# Patient Record
Sex: FEMALE | Race: WHITE | Employment: FULL TIME | URBAN - METROPOLITAN AREA
[De-identification: names, ages, dates, MRNs, and addresses within clinical notes are randomized per-mention and may not be internally consistent; named-entity substitution may affect disease eponyms.]

---

## 2019-06-27 ENCOUNTER — APPOINTMENT (OUTPATIENT)
Dept: GENERAL RADIOLOGY | Age: 59
DRG: 872 | End: 2019-06-27
Payer: COMMERCIAL

## 2019-06-27 ENCOUNTER — HOSPITAL ENCOUNTER (INPATIENT)
Age: 59
LOS: 2 days | Discharge: HOME | DRG: 872 | End: 2019-06-30
Attending: EMERGENCY MEDICINE | Admitting: INTERNAL MEDICINE
Payer: COMMERCIAL

## 2019-06-27 DIAGNOSIS — R78.81 BACTEREMIA: ICD-10-CM

## 2019-06-27 DIAGNOSIS — R42 LIGHTHEADEDNESS: ICD-10-CM

## 2019-06-27 DIAGNOSIS — R51.9 NONINTRACTABLE HEADACHE, UNSPECIFIED CHRONICITY PATTERN, UNSPECIFIED HEADACHE TYPE: ICD-10-CM

## 2019-06-27 DIAGNOSIS — M79.10 MYALGIA: ICD-10-CM

## 2019-06-27 DIAGNOSIS — R65.10 SIRS (SYSTEMIC INFLAMMATORY RESPONSE SYNDROME) (HCC): Primary | ICD-10-CM

## 2019-06-27 LAB
ALBUMIN SERPL-MCNC: 4.2 G/DL (ref 3.5–5.2)
ALP BLD-CCNC: 111 U/L (ref 35–104)
ALT SERPL-CCNC: 17 U/L (ref 0–32)
ANION GAP SERPL CALCULATED.3IONS-SCNC: 12 MMOL/L (ref 7–16)
AST SERPL-CCNC: 20 U/L (ref 0–31)
B.E.: 1 MMOL/L (ref -3–3)
BILIRUB SERPL-MCNC: 0.7 MG/DL (ref 0–1.2)
BILIRUBIN URINE: NEGATIVE
BLOOD, URINE: NEGATIVE
BUN BLDV-MCNC: 26 MG/DL (ref 8–23)
CALCIUM SERPL-MCNC: 9.6 MG/DL (ref 8.6–10.2)
CHLORIDE BLD-SCNC: 100 MMOL/L (ref 98–107)
CHP ED QC CHECK: YES
CLARITY: CLEAR
CO2: 23 MMOL/L (ref 22–29)
COHB: 3.5 % (ref 0–1.5)
COLOR: YELLOW
COMMENT: ABNORMAL
CREAT SERPL-MCNC: 1.1 MG/DL (ref 0.5–1)
CRITICAL: ABNORMAL
DATE ANALYZED: ABNORMAL
DATE OF COLLECTION: ABNORMAL
FIO2: 35 %
GFR AFRICAN AMERICAN: >60
GFR NON-AFRICAN AMERICAN: 51 ML/MIN/1.73
GLUCOSE BLD-MCNC: 137 MG/DL
GLUCOSE BLD-MCNC: 143 MG/DL (ref 74–99)
GLUCOSE URINE: NEGATIVE MG/DL
HCO3: 29.7 MMOL/L (ref 22–26)
HCT VFR BLD CALC: 39.1 % (ref 34–48)
HEMOGLOBIN: 12.7 G/DL (ref 11.5–15.5)
HHB: 7.7 % (ref 0–5)
INFLUENZA A BY PCR: NOT DETECTED
INFLUENZA B BY PCR: NOT DETECTED
KETONES, URINE: NEGATIVE MG/DL
LAB: ABNORMAL
LACTIC ACID: 1.4 MMOL/L (ref 0.5–2.2)
LEUKOCYTE ESTERASE, URINE: NEGATIVE
Lab: ABNORMAL
MCH RBC QN AUTO: 29.3 PG (ref 26–35)
MCHC RBC AUTO-ENTMCNC: 32.5 % (ref 32–34.5)
MCV RBC AUTO: 90.1 FL (ref 80–99.9)
METER GLUCOSE: 137 MG/DL (ref 74–99)
METHB: 0.4 % (ref 0–1.5)
MODE: ABNORMAL
NITRITE, URINE: NEGATIVE
O2 CONTENT: 17.7 ML/DL
O2 SATURATION: 92 % (ref 92–98.5)
O2HB: 88.4 % (ref 94–97)
OPERATOR ID: ABNORMAL
PATIENT TEMP: 37 C
PCO2: 65.8 MMHG (ref 35–45)
PDW BLD-RTO: 14.2 FL (ref 11.5–15)
PEEP/CPAP: 6 CMH2O
PFO2: 1.95 MMHG/%
PH BLOOD GAS: 7.27 (ref 7.35–7.45)
PH UA: 6 (ref 5–9)
PLATELET # BLD: 205 E9/L (ref 130–450)
PMV BLD AUTO: 10.2 FL (ref 7–12)
PO2: 68.2 MMHG (ref 60–100)
POTASSIUM REFLEX MAGNESIUM: 4.4 MMOL/L (ref 3.5–5)
PROTEIN UA: NEGATIVE MG/DL
PS: 14 CMH20
RBC # BLD: 4.34 E12/L (ref 3.5–5.5)
RI(T): 1.41
RR MECHANICAL: 16 B/MIN
SODIUM BLD-SCNC: 135 MMOL/L (ref 132–146)
SOURCE, BLOOD GAS: ABNORMAL
SPECIFIC GRAVITY UA: 1.01 (ref 1–1.03)
T4 TOTAL: 7.3 MCG/DL (ref 4.5–11.7)
THB: 14.2 G/DL (ref 11.5–16.5)
TIME ANALYZED: 1921
TOTAL PROTEIN: 7.5 G/DL (ref 6.4–8.3)
TROPONIN: <0.01 NG/ML (ref 0–0.03)
TSH SERPL DL<=0.05 MIU/L-ACNC: 1.71 UIU/ML (ref 0.27–4.2)
UROBILINOGEN, URINE: 0.2 E.U./DL
WBC # BLD: 20.6 E9/L (ref 4.5–11.5)

## 2019-06-27 PROCEDURE — 84443 ASSAY THYROID STIM HORMONE: CPT

## 2019-06-27 PROCEDURE — 2580000003 HC RX 258: Performed by: EMERGENCY MEDICINE

## 2019-06-27 PROCEDURE — 96374 THER/PROPH/DIAG INJ IV PUSH: CPT

## 2019-06-27 PROCEDURE — 96367 TX/PROPH/DG ADDL SEQ IV INF: CPT

## 2019-06-27 PROCEDURE — 71045 X-RAY EXAM CHEST 1 VIEW: CPT

## 2019-06-27 PROCEDURE — 6360000002 HC RX W HCPCS: Performed by: EMERGENCY MEDICINE

## 2019-06-27 PROCEDURE — 84145 PROCALCITONIN (PCT): CPT

## 2019-06-27 PROCEDURE — 87502 INFLUENZA DNA AMP PROBE: CPT

## 2019-06-27 PROCEDURE — 96366 THER/PROPH/DIAG IV INF ADDON: CPT

## 2019-06-27 PROCEDURE — 83605 ASSAY OF LACTIC ACID: CPT

## 2019-06-27 PROCEDURE — 84484 ASSAY OF TROPONIN QUANT: CPT

## 2019-06-27 PROCEDURE — 82803 BLOOD GASES ANY COMBINATION: CPT

## 2019-06-27 PROCEDURE — 87186 SC STD MICRODIL/AGAR DIL: CPT

## 2019-06-27 PROCEDURE — 80053 COMPREHEN METABOLIC PANEL: CPT

## 2019-06-27 PROCEDURE — 82962 GLUCOSE BLOOD TEST: CPT

## 2019-06-27 PROCEDURE — 96365 THER/PROPH/DIAG IV INF INIT: CPT

## 2019-06-27 PROCEDURE — G0378 HOSPITAL OBSERVATION PER HR: HCPCS

## 2019-06-27 PROCEDURE — 2580000003 HC RX 258: Performed by: INTERNAL MEDICINE

## 2019-06-27 PROCEDURE — 85027 COMPLETE CBC AUTOMATED: CPT

## 2019-06-27 PROCEDURE — 6370000000 HC RX 637 (ALT 250 FOR IP): Performed by: EMERGENCY MEDICINE

## 2019-06-27 PROCEDURE — 87147 CULTURE TYPE IMMUNOLOGIC: CPT

## 2019-06-27 PROCEDURE — 87040 BLOOD CULTURE FOR BACTERIA: CPT

## 2019-06-27 PROCEDURE — 96375 TX/PRO/DX INJ NEW DRUG ADDON: CPT

## 2019-06-27 PROCEDURE — 84436 ASSAY OF TOTAL THYROXINE: CPT

## 2019-06-27 PROCEDURE — 99285 EMERGENCY DEPT VISIT HI MDM: CPT

## 2019-06-27 PROCEDURE — 81003 URINALYSIS AUTO W/O SCOPE: CPT

## 2019-06-27 PROCEDURE — 93005 ELECTROCARDIOGRAM TRACING: CPT

## 2019-06-27 PROCEDURE — 36415 COLL VENOUS BLD VENIPUNCTURE: CPT

## 2019-06-27 RX ORDER — SODIUM CHLORIDE 0.9 % (FLUSH) 0.9 %
10 SYRINGE (ML) INJECTION EVERY 12 HOURS SCHEDULED
Status: DISCONTINUED | OUTPATIENT
Start: 2019-06-27 | End: 2019-06-30 | Stop reason: HOSPADM

## 2019-06-27 RX ORDER — LISINOPRIL 5 MG/1
5 TABLET ORAL DAILY
COMMUNITY

## 2019-06-27 RX ORDER — ONDANSETRON 2 MG/ML
4 INJECTION INTRAMUSCULAR; INTRAVENOUS ONCE
Status: DISCONTINUED | OUTPATIENT
Start: 2019-06-27 | End: 2019-06-30 | Stop reason: HOSPADM

## 2019-06-27 RX ORDER — TRIAMTERENE AND HYDROCHLOROTHIAZIDE 37.5; 25 MG/1; MG/1
1 TABLET ORAL DAILY
COMMUNITY

## 2019-06-27 RX ORDER — MELOXICAM 15 MG/1
15 TABLET ORAL DAILY
COMMUNITY

## 2019-06-27 RX ORDER — 0.9 % SODIUM CHLORIDE 0.9 %
1000 INTRAVENOUS SOLUTION INTRAVENOUS ONCE
Status: COMPLETED | OUTPATIENT
Start: 2019-06-27 | End: 2019-06-27

## 2019-06-27 RX ORDER — FLUOXETINE HYDROCHLORIDE 20 MG/1
20 CAPSULE ORAL DAILY
Status: DISCONTINUED | OUTPATIENT
Start: 2019-06-28 | End: 2019-06-30 | Stop reason: HOSPADM

## 2019-06-27 RX ORDER — ACETAMINOPHEN 500 MG
500 TABLET ORAL ONCE
Status: COMPLETED | OUTPATIENT
Start: 2019-06-27 | End: 2019-06-27

## 2019-06-27 RX ORDER — ASPIRIN 81 MG/1
81 TABLET, CHEWABLE ORAL DAILY
Status: DISCONTINUED | OUTPATIENT
Start: 2019-06-28 | End: 2019-06-30 | Stop reason: HOSPADM

## 2019-06-27 RX ORDER — ONDANSETRON 2 MG/ML
4 INJECTION INTRAMUSCULAR; INTRAVENOUS EVERY 6 HOURS PRN
Status: DISCONTINUED | OUTPATIENT
Start: 2019-06-27 | End: 2019-06-30 | Stop reason: HOSPADM

## 2019-06-27 RX ORDER — KETOROLAC TROMETHAMINE 30 MG/ML
30 INJECTION, SOLUTION INTRAMUSCULAR; INTRAVENOUS ONCE
Status: COMPLETED | OUTPATIENT
Start: 2019-06-27 | End: 2019-06-27

## 2019-06-27 RX ORDER — ACETAMINOPHEN 325 MG/1
650 TABLET ORAL EVERY 4 HOURS PRN
Status: DISCONTINUED | OUTPATIENT
Start: 2019-06-27 | End: 2019-06-30 | Stop reason: HOSPADM

## 2019-06-27 RX ORDER — SODIUM CHLORIDE 0.9 % (FLUSH) 0.9 %
10 SYRINGE (ML) INJECTION PRN
Status: DISCONTINUED | OUTPATIENT
Start: 2019-06-27 | End: 2019-06-30 | Stop reason: HOSPADM

## 2019-06-27 RX ADMIN — PIPERACILLIN, TAZOBACTAM 4.5 G: 4; .5 INJECTION, POWDER, LYOPHILIZED, FOR SOLUTION INTRAVENOUS at 20:05

## 2019-06-27 RX ADMIN — VANCOMYCIN HYDROCHLORIDE 2000 MG: 10 INJECTION, POWDER, LYOPHILIZED, FOR SOLUTION INTRAVENOUS at 21:42

## 2019-06-27 RX ADMIN — ACETAMINOPHEN 500 MG: 500 TABLET, FILM COATED ORAL at 17:51

## 2019-06-27 RX ADMIN — Medication 10 ML: at 21:44

## 2019-06-27 RX ADMIN — KETOROLAC TROMETHAMINE 30 MG: 30 INJECTION, SOLUTION INTRAMUSCULAR at 19:56

## 2019-06-27 RX ADMIN — SODIUM CHLORIDE 1000 ML: 9 INJECTION, SOLUTION INTRAVENOUS at 17:51

## 2019-06-27 ASSESSMENT — PAIN SCALES - GENERAL
PAINLEVEL_OUTOF10: 5
PAINLEVEL_OUTOF10: 5
PAINLEVEL_OUTOF10: 0
PAINLEVEL_OUTOF10: 5
PAINLEVEL_OUTOF10: 5

## 2019-06-27 ASSESSMENT — PAIN DESCRIPTION - DESCRIPTORS
DESCRIPTORS: ACHING;SORE;DULL
DESCRIPTORS: ACHING;SORE;DULL

## 2019-06-27 ASSESSMENT — PAIN DESCRIPTION - FREQUENCY
FREQUENCY: CONTINUOUS
FREQUENCY: CONTINUOUS

## 2019-06-27 ASSESSMENT — PAIN DESCRIPTION - PROGRESSION
CLINICAL_PROGRESSION: GRADUALLY WORSENING
CLINICAL_PROGRESSION: NOT CHANGED

## 2019-06-27 ASSESSMENT — PAIN - FUNCTIONAL ASSESSMENT: PAIN_FUNCTIONAL_ASSESSMENT: PREVENTS OR INTERFERES SOME ACTIVE ACTIVITIES AND ADLS

## 2019-06-27 ASSESSMENT — PAIN DESCRIPTION - PAIN TYPE
TYPE: ACUTE PAIN
TYPE: ACUTE PAIN

## 2019-06-27 ASSESSMENT — PAIN DESCRIPTION - ONSET: ONSET: SUDDEN

## 2019-06-28 ENCOUNTER — APPOINTMENT (OUTPATIENT)
Dept: ULTRASOUND IMAGING | Age: 59
DRG: 872 | End: 2019-06-28
Payer: COMMERCIAL

## 2019-06-28 PROBLEM — R78.81 BACTEREMIA: Status: ACTIVE | Noted: 2019-06-28

## 2019-06-28 LAB
ALBUMIN SERPL-MCNC: 4 G/DL (ref 3.5–5.2)
ALP BLD-CCNC: 108 U/L (ref 35–104)
ALT SERPL-CCNC: 28 U/L (ref 0–32)
ANION GAP SERPL CALCULATED.3IONS-SCNC: 12 MMOL/L (ref 7–16)
ANTISTREPTOLYSIN-O: 39 IU/ML (ref 0–200)
AST SERPL-CCNC: 30 U/L (ref 0–31)
BILIRUB SERPL-MCNC: 1 MG/DL (ref 0–1.2)
BUN BLDV-MCNC: 23 MG/DL (ref 8–23)
C-REACTIVE PROTEIN: 15.4 MG/DL (ref 0–0.4)
CALCIUM SERPL-MCNC: 8.7 MG/DL (ref 8.6–10.2)
CHLORIDE BLD-SCNC: 100 MMOL/L (ref 98–107)
CHOLESTEROL, TOTAL: 163 MG/DL (ref 0–199)
CO2: 22 MMOL/L (ref 22–29)
CREAT SERPL-MCNC: 1.2 MG/DL (ref 0.5–1)
FILM ARRAY ADENOVIRUS: NORMAL
FILM ARRAY ADENOVIRUS: NORMAL
FILM ARRAY BORDETELLA PERTUSSIS: NORMAL
FILM ARRAY BORDETELLA PERTUSSIS: NORMAL
FILM ARRAY CHLAMYDOPHILIA PNEUMONIAE: NORMAL
FILM ARRAY CHLAMYDOPHILIA PNEUMONIAE: NORMAL
FILM ARRAY CORONAVIRUS 229E: NORMAL
FILM ARRAY CORONAVIRUS 229E: NORMAL
FILM ARRAY CORONAVIRUS HKU1: NORMAL
FILM ARRAY CORONAVIRUS HKU1: NORMAL
FILM ARRAY CORONAVIRUS NL63: NORMAL
FILM ARRAY CORONAVIRUS NL63: NORMAL
FILM ARRAY CORONAVIRUS OC43: NORMAL
FILM ARRAY CORONAVIRUS OC43: NORMAL
FILM ARRAY INFLUENZA A VIRUS 09H1: NORMAL
FILM ARRAY INFLUENZA A VIRUS 09H1: NORMAL
FILM ARRAY INFLUENZA A VIRUS H1: NORMAL
FILM ARRAY INFLUENZA A VIRUS H1: NORMAL
FILM ARRAY INFLUENZA A VIRUS H3: NORMAL
FILM ARRAY INFLUENZA A VIRUS H3: NORMAL
FILM ARRAY INFLUENZA A VIRUS: NORMAL
FILM ARRAY INFLUENZA A VIRUS: NORMAL
FILM ARRAY INFLUENZA B: NORMAL
FILM ARRAY INFLUENZA B: NORMAL
FILM ARRAY METAPNEUMOVIRUS: NORMAL
FILM ARRAY METAPNEUMOVIRUS: NORMAL
FILM ARRAY MYCOPLASMA PNEUMONIAE: NORMAL
FILM ARRAY MYCOPLASMA PNEUMONIAE: NORMAL
FILM ARRAY PARAINFLUENZA VIRUS 1: NORMAL
FILM ARRAY PARAINFLUENZA VIRUS 1: NORMAL
FILM ARRAY PARAINFLUENZA VIRUS 2: NORMAL
FILM ARRAY PARAINFLUENZA VIRUS 2: NORMAL
FILM ARRAY PARAINFLUENZA VIRUS 3: NORMAL
FILM ARRAY PARAINFLUENZA VIRUS 3: NORMAL
FILM ARRAY PARAINFLUENZA VIRUS 4: NORMAL
FILM ARRAY PARAINFLUENZA VIRUS 4: NORMAL
FILM ARRAY RESPIRATORY SYNCITIAL VIRUS: NORMAL
FILM ARRAY RESPIRATORY SYNCITIAL VIRUS: NORMAL
FILM ARRAY RHINOVIRUS/ENTEROVIRUS: NORMAL
FILM ARRAY RHINOVIRUS/ENTEROVIRUS: NORMAL
GFR AFRICAN AMERICAN: 55
GFR NON-AFRICAN AMERICAN: 46 ML/MIN/1.73
GLUCOSE BLD-MCNC: 159 MG/DL (ref 74–99)
HBA1C MFR BLD: 6.2 % (ref 4–5.6)
HCT VFR BLD CALC: 37.5 % (ref 34–48)
HDLC SERPL-MCNC: 78 MG/DL
HEMOGLOBIN: 12.3 G/DL (ref 11.5–15.5)
LDL CHOLESTEROL CALCULATED: 73 MG/DL (ref 0–99)
MAGNESIUM: 2 MG/DL (ref 1.6–2.6)
MCH RBC QN AUTO: 29.1 PG (ref 26–35)
MCHC RBC AUTO-ENTMCNC: 32.8 % (ref 32–34.5)
MCV RBC AUTO: 88.9 FL (ref 80–99.9)
METER GLUCOSE: 140 MG/DL (ref 74–99)
PDW BLD-RTO: 14.3 FL (ref 11.5–15)
PHOSPHORUS: 3 MG/DL (ref 2.5–4.5)
PLATELET # BLD: 211 E9/L (ref 130–450)
PMV BLD AUTO: 9.7 FL (ref 7–12)
POTASSIUM SERPL-SCNC: 4.4 MMOL/L (ref 3.5–5)
PROCALCITONIN: 0.14 NG/ML (ref 0–0.08)
RBC # BLD: 4.22 E12/L (ref 3.5–5.5)
SEDIMENTATION RATE, ERYTHROCYTE: 38 MM/HR (ref 0–20)
SODIUM BLD-SCNC: 134 MMOL/L (ref 132–146)
TOTAL PROTEIN: 7 G/DL (ref 6.4–8.3)
TRIGL SERPL-MCNC: 61 MG/DL (ref 0–149)
VLDLC SERPL CALC-MCNC: 12 MG/DL
WBC # BLD: 18.5 E9/L (ref 4.5–11.5)

## 2019-06-28 PROCEDURE — 1200000000 HC SEMI PRIVATE

## 2019-06-28 PROCEDURE — 82962 GLUCOSE BLOOD TEST: CPT

## 2019-06-28 PROCEDURE — 87486 CHLMYD PNEUM DNA AMP PROBE: CPT

## 2019-06-28 PROCEDURE — 87798 DETECT AGENT NOS DNA AMP: CPT

## 2019-06-28 PROCEDURE — 6360000002 HC RX W HCPCS: Performed by: INTERNAL MEDICINE

## 2019-06-28 PROCEDURE — 6360000002 HC RX W HCPCS: Performed by: SPECIALIST

## 2019-06-28 PROCEDURE — 87088 URINE BACTERIA CULTURE: CPT

## 2019-06-28 PROCEDURE — 84100 ASSAY OF PHOSPHORUS: CPT

## 2019-06-28 PROCEDURE — 87581 M.PNEUMON DNA AMP PROBE: CPT

## 2019-06-28 PROCEDURE — 83036 HEMOGLOBIN GLYCOSYLATED A1C: CPT

## 2019-06-28 PROCEDURE — 6370000000 HC RX 637 (ALT 250 FOR IP): Performed by: SPECIALIST

## 2019-06-28 PROCEDURE — 85027 COMPLETE CBC AUTOMATED: CPT

## 2019-06-28 PROCEDURE — 87633 RESP VIRUS 12-25 TARGETS: CPT

## 2019-06-28 PROCEDURE — 80053 COMPREHEN METABOLIC PANEL: CPT

## 2019-06-28 PROCEDURE — 87040 BLOOD CULTURE FOR BACTERIA: CPT

## 2019-06-28 PROCEDURE — 86060 ANTISTREPTOLYSIN O TITER: CPT

## 2019-06-28 PROCEDURE — 2580000003 HC RX 258: Performed by: INTERNAL MEDICINE

## 2019-06-28 PROCEDURE — 86140 C-REACTIVE PROTEIN: CPT

## 2019-06-28 PROCEDURE — 85651 RBC SED RATE NONAUTOMATED: CPT

## 2019-06-28 PROCEDURE — 87450 HC DIRECT STREP B ANTIGEN: CPT

## 2019-06-28 PROCEDURE — 36415 COLL VENOUS BLD VENIPUNCTURE: CPT

## 2019-06-28 PROCEDURE — 83735 ASSAY OF MAGNESIUM: CPT

## 2019-06-28 PROCEDURE — 6370000000 HC RX 637 (ALT 250 FOR IP): Performed by: INTERNAL MEDICINE

## 2019-06-28 PROCEDURE — 93971 EXTREMITY STUDY: CPT

## 2019-06-28 PROCEDURE — 2580000003 HC RX 258: Performed by: SPECIALIST

## 2019-06-28 PROCEDURE — 80061 LIPID PANEL: CPT

## 2019-06-28 RX ORDER — LINEZOLID 600 MG/1
600 TABLET, FILM COATED ORAL EVERY 12 HOURS SCHEDULED
Qty: 28 TABLET | Refills: 0 | Status: SHIPPED | OUTPATIENT
Start: 2019-06-28 | End: 2019-07-12

## 2019-06-28 RX ORDER — LINEZOLID 600 MG/1
600 TABLET, FILM COATED ORAL EVERY 12 HOURS SCHEDULED
Status: DISCONTINUED | OUTPATIENT
Start: 2019-06-28 | End: 2019-06-29

## 2019-06-28 RX ORDER — SODIUM CHLORIDE, SODIUM LACTATE, POTASSIUM CHLORIDE, CALCIUM CHLORIDE 600; 310; 30; 20 MG/100ML; MG/100ML; MG/100ML; MG/100ML
INJECTION, SOLUTION INTRAVENOUS CONTINUOUS
Status: DISCONTINUED | OUTPATIENT
Start: 2019-06-28 | End: 2019-06-30 | Stop reason: HOSPADM

## 2019-06-28 RX ORDER — LINEZOLID 600 MG/1
600 TABLET, FILM COATED ORAL EVERY 12 HOURS SCHEDULED
Qty: 28 TABLET | Refills: 0 | Status: SHIPPED | OUTPATIENT
Start: 2019-06-28 | End: 2019-06-28

## 2019-06-28 RX ADMIN — ASPIRIN 81 MG 81 MG: 81 TABLET ORAL at 09:32

## 2019-06-28 RX ADMIN — SODIUM CHLORIDE, POTASSIUM CHLORIDE, SODIUM LACTATE AND CALCIUM CHLORIDE: 600; 310; 30; 20 INJECTION, SOLUTION INTRAVENOUS at 11:04

## 2019-06-28 RX ADMIN — ACETAMINOPHEN 650 MG: 325 TABLET, FILM COATED ORAL at 06:15

## 2019-06-28 RX ADMIN — ACETAMINOPHEN 650 MG: 325 TABLET, FILM COATED ORAL at 23:30

## 2019-06-28 RX ADMIN — ENOXAPARIN SODIUM 40 MG: 40 INJECTION SUBCUTANEOUS at 09:32

## 2019-06-28 RX ADMIN — ACETAMINOPHEN 650 MG: 325 TABLET, FILM COATED ORAL at 12:02

## 2019-06-28 RX ADMIN — SODIUM CHLORIDE, POTASSIUM CHLORIDE, SODIUM LACTATE AND CALCIUM CHLORIDE: 600; 310; 30; 20 INJECTION, SOLUTION INTRAVENOUS at 23:31

## 2019-06-28 RX ADMIN — ACETAMINOPHEN 650 MG: 325 TABLET, FILM COATED ORAL at 00:09

## 2019-06-28 RX ADMIN — FLUOXETINE 20 MG: 20 CAPSULE ORAL at 09:32

## 2019-06-28 RX ADMIN — ACETAMINOPHEN 650 MG: 325 TABLET, FILM COATED ORAL at 18:30

## 2019-06-28 RX ADMIN — WATER 2 G: 1 INJECTION INTRAMUSCULAR; INTRAVENOUS; SUBCUTANEOUS at 11:44

## 2019-06-28 RX ADMIN — PIPERACILLIN AND TAZOBACTAM 3.38 G: 3; .375 INJECTION, POWDER, FOR SOLUTION INTRAVENOUS at 05:37

## 2019-06-28 RX ADMIN — METFORMIN HYDROCHLORIDE 500 MG: 500 TABLET ORAL at 09:32

## 2019-06-28 RX ADMIN — LINEZOLID 600 MG: 600 TABLET, FILM COATED ORAL at 20:31

## 2019-06-28 ASSESSMENT — PAIN SCALES - GENERAL
PAINLEVEL_OUTOF10: 0
PAINLEVEL_OUTOF10: 4
PAINLEVEL_OUTOF10: 3
PAINLEVEL_OUTOF10: 5
PAINLEVEL_OUTOF10: 2
PAINLEVEL_OUTOF10: 5
PAINLEVEL_OUTOF10: 0
PAINLEVEL_OUTOF10: 5
PAINLEVEL_OUTOF10: 0
PAINLEVEL_OUTOF10: 3

## 2019-06-28 ASSESSMENT — PAIN DESCRIPTION - ONSET: ONSET: GRADUAL

## 2019-06-28 ASSESSMENT — PAIN DESCRIPTION - DESCRIPTORS
DESCRIPTORS: HEADACHE;ACHING
DESCRIPTORS: HEADACHE
DESCRIPTORS: ACHING;DISCOMFORT;HEADACHE
DESCRIPTORS: ACHING;HEADACHE

## 2019-06-28 ASSESSMENT — PAIN DESCRIPTION - PROGRESSION: CLINICAL_PROGRESSION: GRADUALLY WORSENING

## 2019-06-28 ASSESSMENT — PAIN DESCRIPTION - PAIN TYPE
TYPE: ACUTE PAIN

## 2019-06-28 ASSESSMENT — PAIN DESCRIPTION - LOCATION
LOCATION: HEAD;BACK
LOCATION: HEAD

## 2019-06-28 ASSESSMENT — PAIN DESCRIPTION - FREQUENCY: FREQUENCY: CONTINUOUS

## 2019-06-28 ASSESSMENT — PAIN - FUNCTIONAL ASSESSMENT: PAIN_FUNCTIONAL_ASSESSMENT: ACTIVITIES ARE NOT PREVENTED

## 2019-06-28 NOTE — CARE COORDINATION
Ss note: 6/28/2019.2:32 PM sw notified that pts ins policy termed on 5-13-88. SW made referral to [facility] in Public Benefits to screen pt for HFA/HCAP eligibility as pt does not have insurance, therefore no coverage for home IV. Keith Vicente from [employer] will investigate self pay price but pt does not have insurance coverage at this time. [facility] will follow. TOSHIA Shirley    Ss note:6/28/2019.1:18 PM consult noted for home IV. Awaiting verification of pts [employer] insurance and to determine if pt has any home iv coverage. NISREEN Martin and [employer] with [employer] are investigating. [facility]IRAG will follow.  TOSHIA Shirley

## 2019-06-28 NOTE — PROGRESS NOTES
Soft. Non-tender. Non-distended. Bowel sounds positive. No organomegaly or masses. No pain on palpation    Extremities:  Peripheral pulses present. Mild lower extremity edema. There is mild erythema associated with the right lower extremity as well. Neurologic:  Alert x 3. No focal deficit. Cranial nerves grossly intact. Skin:  No petechia. No hemorrhage. No wounds. CBC with Differential:    Lab Results   Component Value Date    WBC 18.5 06/28/2019    RBC 4.22 06/28/2019    HGB 12.3 06/28/2019    HCT 37.5 06/28/2019     06/28/2019    MCV 88.9 06/28/2019    MCH 29.1 06/28/2019    MCHC 32.8 06/28/2019    RDW 14.3 06/28/2019    LYMPHOPCT 19.3 09/24/2017    MONOPCT 8.4 09/24/2017    BASOPCT 0.4 09/24/2017    MONOSABS 0.67 09/24/2017    LYMPHSABS 1.53 09/24/2017    EOSABS 0.11 09/24/2017    BASOSABS 0.03 09/24/2017     BMP:    Lab Results   Component Value Date     06/28/2019    K 4.4 06/28/2019    K 4.4 06/27/2019     06/28/2019    CO2 22 06/28/2019    BUN 23 06/28/2019    LABALBU 4.0 06/28/2019    CREATININE 1.2 06/28/2019    CALCIUM 8.7 06/28/2019    GFRAA 55 06/28/2019    LABGLOM 46 06/28/2019    GLUCOSE 159 06/28/2019       Other Data:    No intake or output data in the 24 hours ending 06/28/19 1039      Scheduled Medications:   ondansetron  4 mg Intravenous Once    FLUoxetine  20 mg Oral Daily    aspirin  81 mg Oral Daily    sodium chloride flush  10 mL Intravenous 2 times per day    enoxaparin  40 mg Subcutaneous Daily    piperacillin-tazobactam  3.375 g Intravenous Q8H    metFORMIN  500 mg Oral Daily with breakfast         Infusion Medications:   lactated ringers         Assessment:   1. Sepsis secondary to gram-positive bacteremia of unclear source  2. Suspected concomitant viral illness  3. Hypothyroidism  4. Non insulin dependent diabetes mellitus type II  5. Depression  6.  NICHOL    Plan:   Following my examination, blood culture has returned positive for gram-positive

## 2019-06-28 NOTE — H&P
murmur, rub, or gallop  Abdomen:  soft, nontender, nondistended; normoactive bowel sounds; no organomegaly  Extremities:  extremities normal, atraumatic, no cyanosis or edema  Musculokeletal:  no joint swelling, no muscle tenderness. ROM normal in all joints of extremities. Neurologic:  mental status A&Ox3, thought content appropriate; CN II-XII grossly intact; sensation intact, motor strength 5/5 globally; no slurred speech  Osteopathic:  Patient examined in seated position; normal lumbar lordosis and thoracic kyphosis; no TART changes to paraspinal musculature    Laboratory Data  Recent Results (from the past 24 hour(s))   Urinalysis, reflex to microscopic    Collection Time: 06/27/19  5:25 PM   Result Value Ref Range    Color, UA Yellow Straw/Yellow    Clarity, UA Clear Clear    Glucose, Ur Negative Negative mg/dL    Bilirubin Urine Negative Negative    Ketones, Urine Negative Negative mg/dL    Specific Gravity, UA 1.015 1.005 - 1.030    Blood, Urine Negative Negative    pH, UA 6.0 5.0 - 9.0    Protein, UA Negative Negative mg/dL    Urobilinogen, Urine 0.2 <2.0 E.U./dL    Nitrite, Urine Negative Negative    Leukocyte Esterase, Urine Negative Negative   POCT Glucose    Collection Time: 06/27/19  5:30 PM   Result Value Ref Range    Glucose 137 mg/dL    QC OK?  yes    POCT Glucose    Collection Time: 06/27/19  5:30 PM   Result Value Ref Range    Meter Glucose 137 (H) 74 - 99 mg/dL   EKG 12 Lead    Collection Time: 06/27/19  5:30 PM   Result Value Ref Range    Ventricular Rate 96 BPM    Atrial Rate 96 BPM    P-R Interval 148 ms    QRS Duration 78 ms    Q-T Interval 352 ms    QTc Calculation (Bazett) 444 ms    P Axis 59 degrees    T Axis 49 degrees   RAPID INFLUENZA A/B ANTIGENS    Collection Time: 06/27/19  5:35 PM   Result Value Ref Range    Influenza A by PCR Not Detected Not Detected    Influenza B by PCR Not Detected Not Detected   CBC    Collection Time: 06/27/19  5:50 PM   Result Value Ref Range    WBC 20.6 200 Indication: Lightheadedness Comparison: None available Technique: Portable AP upright chest radiograph was obtained. Findings: The cardiomediastinal silhouette is normal in size and contours. Bilateral lungs and costophrenic angles are clear. There is no evidence of pneumothorax. No acute cardiopulmonary disease. Assessment and Plan  Patient is a 59 y.o. female who presented with dizziness, fever, chills, myalgias. The active problem list is as follows:    · SIRS  · Question viral illness  · Hypothyroidism  · Non insulin dependent diabetes mellitus  · Depression  · NICHOL    -Check procalcitonin, continue zosyn and narrow  -Respiratory film array  -Await blood cultures  -Patient noncompliant with CPAP at home for years  -Unaware of exact dosing or medications, she will have her daughter bring them in     · Routine labs in the morning. · DVT prophylaxis. lovenox  · Please see orders for further management and care. The plan was discussed with Dr. Franky Bauman.     [facility] Claudia BRYSON PGYII  10:15 PM  6/27/2019

## 2019-06-28 NOTE — PLAN OF CARE
Problem: Falls - Risk of:  Goal: Will remain free from falls  Description  Will remain free from falls  6/28/2019 1322 by Juliet Keller RN  Outcome: Met This Shift

## 2019-06-28 NOTE — CONSULTS
HEME-LYMPH:   No easy bruising or bleeding  GI:     No nausea, vomiting or diarrhea  :     No urinary complaints  NEURO:    No seizures, stroke, HA  MUSCULOSKELETAL:    muscle aches or pain, no joint pain  SKIN:     No rash or itch  PSYCH:    No depression or anxiety    CURRENT MEDICATIONS     Current Facility-Administered Medications:     lactated ringers infusion, , Intravenous, Continuous, Renaye Copper, DO, Last Rate: 75 mL/hr at 06/28/19 1104    ondansetron (ZOFRAN) injection 4 mg, 4 mg, Intravenous, Once, Sintia Arora MD    FLUoxetine (PROZAC) capsule 20 mg, 20 mg, Oral, Daily, Em Taylor DO, 20 mg at 06/28/19 0932    aspirin chewable tablet 81 mg, 81 mg, Oral, Daily, Em Taylor DO, 81 mg at 06/28/19 0932    sodium chloride flush 0.9 % injection 10 mL, 10 mL, Intravenous, 2 times per day, Douglas Taylor DO, 10 mL at 06/27/19 2144    sodium chloride flush 0.9 % injection 10 mL, 10 mL, Intravenous, PRN, Fernando Morton, DO    magnesium hydroxide (MILK OF MAGNESIA) 400 MG/5ML suspension 30 mL, 30 mL, Oral, Daily PRN, Fernandoalesha RobbMorton, DO    [facility]ron [facility] injection 4 mg, 4 mg, Intravenous, Q6H PRN, Douglas Taylor DO    enoxaparin (LOVENOX) injection 40 mg, 40 mg, Subcutaneous, Daily, Em Taylor DO, 40 mg at 06/28/19 0932    acetaminophen (TYLENOL) tablet 650 mg, 650 mg, Oral, Q4H PRN, Jacquelin Taylor DO, 650 mg at 06/28/19 0615    piperacillin-tazobactam (ZOSYN) 3.375 g in dextrose 5 % 50 mL IVPB extended infusion (mini-bag), 3.375 g, Intravenous, Q8H, Douglas Taylor DO, Stopped at 06/28/19 5716    metFORMIN (GLUCOPHAGE) tablet 500 mg, 500 mg, Oral, Daily with breakfast, Jacquelin Taylor DO, 500 mg at 06/28/19 0932  ALLERGIES     Patient has no known allergies. There is no immunization history on file for this patient.   PAST MEDICAL HISTORY     Past Medical History:   Diagnosis Date    Depression     Diabetes mellitus ([facility] [address]     92.0 92.0 - 98.5 %    PO2/FIO2 1.95 mmHg/%    RI(T) 1.41     O2Hb 88.4 (L) 94.0 - 97.0 %    COHb 3.5 (H) 0.0 - 1.5 %    MetHb 0.4 0.0 - 1.5 %    O2 Content 17.7 mL/dL    HHb 7.7 (H) 0.0 - 5.0 %    tHb (est) 14.2 11.5 - 16.5 g/dL    Mode BIPAP     FIO2 35.0 %    Rr Mechanical 16.0 b/min    Peep/Cpap 6.0 cmH2O    PS 14 cmH20    Comment       IGNORE THESE RESULTS - RESULTS DO NOT BELONG TO THIS PATIENT    Date Of Collection      Time Collected      Pt Temp 37.0 C     ID [id number]     Lab [id number]     Critical(s) Notified . No Critical Values    POCT Glucose   Result Value Ref Range    Glucose 137 mg/dL    QC OK? yes    POCT Glucose   Result Value Ref Range    Meter Glucose 137 (H) 74 - 99 mg/dL   POCT Glucose   Result Value Ref Range    Meter Glucose 140 (H) 74 - 99 mg/dL   EKG 12 Lead   Result Value Ref Range    Ventricular Rate 96 BPM    Atrial Rate 96 BPM    P-R Interval 148 ms    QRS Duration 78 ms    Q-T Interval 352 ms    QTc Calculation (Bazett) 444 ms    P Axis 59 degrees    T Axis 49 degrees     MICROBIOLOGY:  Cultures :   Lab Results   Component Value Date    [facility]  06/27/2019     Gram stain performed from blood culture bottle media  Gram positive cocci in chains       No results found for: BLOODCULT2, ORG     Urine Culture, Routine   Date Value Ref Range Status   09/24/2017   Final    ,000 CFU/mL  Mixed deven isolated. Further workup and sensitivity testing  is not routinely indicated and will not be performed. Mixed deven isolated includes:  Mixed gram positive organisms       Patient is a 59 y.o. female who presented with   Chief Complaint   Patient presents with    Fever     bodyaches, fever and dizziness started today     FINAL IMPRESSION    Sepsis  RLE  cellulitis with gpc chains septicemia    Vanco/ceftriaxone  Stop piptazo  Check trough  Aso/esr  F/u blood cx  Echo   Check home iv      Imaging and labs were reviewed per medical records and any ID pertinent labs were addressed with the patient.

## 2019-06-29 LAB
ALBUMIN SERPL-MCNC: 3.3 G/DL (ref 3.5–5.2)
ALP BLD-CCNC: 97 U/L (ref 35–104)
ALT SERPL-CCNC: 27 U/L (ref 0–32)
ANION GAP SERPL CALCULATED.3IONS-SCNC: 12 MMOL/L (ref 7–16)
AST SERPL-CCNC: 22 U/L (ref 0–31)
BILIRUB SERPL-MCNC: 0.3 MG/DL (ref 0–1.2)
BUN BLDV-MCNC: 15 MG/DL (ref 8–23)
CALCIUM SERPL-MCNC: 8.4 MG/DL (ref 8.6–10.2)
CHLORIDE BLD-SCNC: 102 MMOL/L (ref 98–107)
CO2: 22 MMOL/L (ref 22–29)
CREAT SERPL-MCNC: 0.9 MG/DL (ref 0.5–1)
GFR AFRICAN AMERICAN: >60
GFR NON-AFRICAN AMERICAN: >60 ML/MIN/1.73
GLUCOSE BLD-MCNC: 242 MG/DL (ref 74–99)
HCT VFR BLD CALC: 34.7 % (ref 34–48)
HEMOGLOBIN: 11.1 G/DL (ref 11.5–15.5)
INR BLD: 1.1
LV EF: 50 %
LVEF MODALITY: NORMAL
MCH RBC QN AUTO: 28.5 PG (ref 26–35)
MCHC RBC AUTO-ENTMCNC: 32 % (ref 32–34.5)
MCV RBC AUTO: 89.2 FL (ref 80–99.9)
PDW BLD-RTO: 14.2 FL (ref 11.5–15)
PLATELET # BLD: 172 E9/L (ref 130–450)
PMV BLD AUTO: 10 FL (ref 7–12)
POTASSIUM SERPL-SCNC: 4.1 MMOL/L (ref 3.5–5)
PROTHROMBIN TIME: 12 SEC (ref 9.3–12.4)
RBC # BLD: 3.89 E12/L (ref 3.5–5.5)
SODIUM BLD-SCNC: 136 MMOL/L (ref 132–146)
STREP PNEUMONIAE ANTIGEN, URINE: NORMAL
TOTAL PROTEIN: 6.4 G/DL (ref 6.4–8.3)
WBC # BLD: 6.6 E9/L (ref 4.5–11.5)

## 2019-06-29 PROCEDURE — 85610 PROTHROMBIN TIME: CPT

## 2019-06-29 PROCEDURE — 6360000002 HC RX W HCPCS: Performed by: SPECIALIST

## 2019-06-29 PROCEDURE — 6360000002 HC RX W HCPCS: Performed by: INTERNAL MEDICINE

## 2019-06-29 PROCEDURE — 1200000000 HC SEMI PRIVATE

## 2019-06-29 PROCEDURE — 85027 COMPLETE CBC AUTOMATED: CPT

## 2019-06-29 PROCEDURE — 6370000000 HC RX 637 (ALT 250 FOR IP): Performed by: SPECIALIST

## 2019-06-29 PROCEDURE — 6360000004 HC RX CONTRAST MEDICATION: Performed by: INTERNAL MEDICINE

## 2019-06-29 PROCEDURE — 80053 COMPREHEN METABOLIC PANEL: CPT

## 2019-06-29 PROCEDURE — 2580000003 HC RX 258: Performed by: SPECIALIST

## 2019-06-29 PROCEDURE — C8929 TTE W OR WO FOL WCON,DOPPLER: HCPCS

## 2019-06-29 PROCEDURE — 36415 COLL VENOUS BLD VENIPUNCTURE: CPT

## 2019-06-29 PROCEDURE — 6370000000 HC RX 637 (ALT 250 FOR IP): Performed by: INTERNAL MEDICINE

## 2019-06-29 RX ORDER — HEPARIN SODIUM (PORCINE) LOCK FLUSH IV SOLN 100 UNIT/ML 100 UNIT/ML
3 SOLUTION INTRAVENOUS PRN
Status: DISCONTINUED | OUTPATIENT
Start: 2019-06-29 | End: 2019-06-30 | Stop reason: HOSPADM

## 2019-06-29 RX ORDER — LIDOCAINE HYDROCHLORIDE 10 MG/ML
5 INJECTION, SOLUTION INFILTRATION; PERINEURAL ONCE
Status: DISCONTINUED | OUTPATIENT
Start: 2019-06-29 | End: 2019-06-30 | Stop reason: HOSPADM

## 2019-06-29 RX ORDER — SODIUM CHLORIDE 0.9 % (FLUSH) 0.9 %
10 SYRINGE (ML) INJECTION PRN
Status: DISCONTINUED | OUTPATIENT
Start: 2019-06-29 | End: 2019-06-30 | Stop reason: HOSPADM

## 2019-06-29 RX ORDER — HEPARIN SODIUM (PORCINE) LOCK FLUSH IV SOLN 100 UNIT/ML 100 UNIT/ML
3 SOLUTION INTRAVENOUS EVERY 12 HOURS SCHEDULED
Status: DISCONTINUED | OUTPATIENT
Start: 2019-06-29 | End: 2019-06-30 | Stop reason: HOSPADM

## 2019-06-29 RX ADMIN — ACETAMINOPHEN 650 MG: 325 TABLET, FILM COATED ORAL at 20:20

## 2019-06-29 RX ADMIN — METFORMIN HYDROCHLORIDE 500 MG: 500 TABLET ORAL at 08:32

## 2019-06-29 RX ADMIN — PERFLUTREN 1.65 MG: 6.52 INJECTION, SUSPENSION INTRAVENOUS at 15:12

## 2019-06-29 RX ADMIN — ACETAMINOPHEN 650 MG: 325 TABLET, FILM COATED ORAL at 07:44

## 2019-06-29 RX ADMIN — ENOXAPARIN SODIUM 40 MG: 40 INJECTION SUBCUTANEOUS at 08:32

## 2019-06-29 RX ADMIN — LINEZOLID 600 MG: 600 TABLET, FILM COATED ORAL at 08:32

## 2019-06-29 RX ADMIN — FLUOXETINE 20 MG: 20 CAPSULE ORAL at 08:32

## 2019-06-29 RX ADMIN — ASPIRIN 81 MG 81 MG: 81 TABLET ORAL at 08:32

## 2019-06-29 RX ADMIN — WATER 2 G: 1 INJECTION INTRAMUSCULAR; INTRAVENOUS; SUBCUTANEOUS at 11:55

## 2019-06-29 ASSESSMENT — PAIN SCALES - GENERAL
PAINLEVEL_OUTOF10: 2
PAINLEVEL_OUTOF10: 4

## 2019-06-29 ASSESSMENT — PAIN DESCRIPTION - PAIN TYPE: TYPE: ACUTE PAIN

## 2019-06-29 ASSESSMENT — PAIN DESCRIPTION - DESCRIPTORS: DESCRIPTORS: ACHING

## 2019-06-29 ASSESSMENT — PAIN DESCRIPTION - LOCATION: LOCATION: GENERALIZED

## 2019-06-29 NOTE — PROGRESS NOTES
06/29/2019    RBC 3.89 06/29/2019    HGB 11.1 06/29/2019    HCT 34.7 06/29/2019     06/29/2019    MCV 89.2 06/29/2019    MCH 28.5 06/29/2019    MCHC 32.0 06/29/2019    RDW 14.2 06/29/2019    LYMPHOPCT 19.3 09/24/2017    MONOPCT 8.4 09/24/2017    BASOPCT 0.4 09/24/2017    MONOSABS 0.67 09/24/2017    LYMPHSABS 1.53 09/24/2017    EOSABS 0.11 09/24/2017    BASOSABS 0.03 09/24/2017     BMP:    Lab Results   Component Value Date     06/28/2019    K 4.4 06/28/2019    K 4.4 06/27/2019     06/28/2019    CO2 22 06/28/2019    BUN 23 06/28/2019    LABALBU 4.0 06/28/2019    CREATININE 1.2 06/28/2019    CALCIUM 8.7 06/28/2019    GFRAA 55 06/28/2019    LABGLOM 46 06/28/2019    GLUCOSE 159 06/28/2019       Other Data:      Intake/Output Summary (Last 24 hours) at 6/29/2019 1005  Last data filed at 6/29/2019 0730  Gross per 24 hour   Intake 1657 ml   Output 300 ml   Net 1357 ml         Scheduled Medications:   cefTRIAXone (ROCEPHIN) IV  2 g Intravenous Q24H    linezolid  600 mg Oral 2 times per day    ondansetron  4 mg Intravenous Once    FLUoxetine  20 mg Oral Daily    aspirin  81 mg Oral Daily    sodium chloride flush  10 mL Intravenous 2 times per day    enoxaparin  40 mg Subcutaneous Daily    metFORMIN  500 mg Oral Daily with breakfast         Infusion Medications:   lactated ringers 75 mL/hr at 06/28/19 2331       Assessment:   1. Sepsis secondary to Streptococcus bacteremia possibly related to right lower extremity cellulitis  2. Suspected concomitant viral illness  3. Hypothyroidism  4. Non insulin dependent diabetes mellitus type II  5. Depression  6. NICHOL  7. Morbid obesity    Plan:   2D echocardiogram is pending at this point. The right lower extremity cellulitic process appears improved. She is maintained on antibiotics as per the infectious disease team.  ASO titers are negative.   I anticipate the need for IV antibiotic therapy upon discharge we will attempt to move forward with PICC line

## 2019-06-29 NOTE — PLAN OF CARE
Problem: Falls - Risk of:  Goal: Will remain free from falls  6/29/2019 0446 by Susy Valenzuela RN  Outcome: Met This Shift  6/28/2019 2220 by Ryan Munguia RN  Outcome: Met This Shift  Goal: Absence of physical injury  6/29/2019 0446 by Susy Valenzuela RN  Outcome: Met This Shift  6/28/2019 2220 by Ryan Munguia RN  Outcome: Met This Shift

## 2019-06-29 NOTE — PLAN OF CARE
Problem: Falls - Risk of:  Goal: Will remain free from falls  Description  Will remain free from falls  6/29/2019 0804 by Tamara Cano RN  Outcome: Met This Shift  6/29/2019 0446 by Lindsay lBue RN  Outcome: Met This Shift  6/28/2019 2220 by Demetrius Handley RN  Outcome: Met This Shift  Goal: Absence of physical injury  Description  Absence of physical injury  6/29/2019 0804 by Tamara Cano RN  Outcome: Met This Shift  6/29/2019 0446 by Lindsay Blue RN  Outcome: Met This Shift  6/28/2019 2220 by Demetrius Handley RN  Outcome: Met This Shift

## 2019-06-29 NOTE — CARE COORDINATION
Ss note:6/29/2019 2:19 PM Per charge nurse pt to get picc line today. Pt will require her IV dose tomorrow prior to discharge. [facility] WITH [facility] WILL NEED NOTIFIED WHEN PT IS DISCHARGED [phone] as pt will go to their infusion center on Monday at 1:30 PM. Omar Smith will confirm all plans with pt via phone today.  TOSHIA Lockhart

## 2019-06-30 VITALS
OXYGEN SATURATION: 95 % | HEART RATE: 66 BPM | TEMPERATURE: 97.8 F | RESPIRATION RATE: 16 BRPM | HEIGHT: 61 IN | WEIGHT: 238.9 LBS | BODY MASS INDEX: 45.1 KG/M2 | DIASTOLIC BLOOD PRESSURE: 60 MMHG | SYSTOLIC BLOOD PRESSURE: 129 MMHG

## 2019-06-30 LAB
ALBUMIN SERPL-MCNC: 3.5 G/DL (ref 3.5–5.2)
ALP BLD-CCNC: 102 U/L (ref 35–104)
ALT SERPL-CCNC: 21 U/L (ref 0–32)
ANION GAP SERPL CALCULATED.3IONS-SCNC: 13 MMOL/L (ref 7–16)
AST SERPL-CCNC: 15 U/L (ref 0–31)
BILIRUB SERPL-MCNC: <0.2 MG/DL (ref 0–1.2)
BUN BLDV-MCNC: 15 MG/DL (ref 8–23)
CALCIUM SERPL-MCNC: 8.8 MG/DL (ref 8.6–10.2)
CHLORIDE BLD-SCNC: 102 MMOL/L (ref 98–107)
CO2: 25 MMOL/L (ref 22–29)
CREAT SERPL-MCNC: 0.8 MG/DL (ref 0.5–1)
GFR AFRICAN AMERICAN: >60
GFR NON-AFRICAN AMERICAN: >60 ML/MIN/1.73
GLUCOSE BLD-MCNC: 168 MG/DL (ref 74–99)
HCT VFR BLD CALC: 37.5 % (ref 34–48)
HEMOGLOBIN: 11.7 G/DL (ref 11.5–15.5)
MCH RBC QN AUTO: 28.4 PG (ref 26–35)
MCHC RBC AUTO-ENTMCNC: 31.2 % (ref 32–34.5)
MCV RBC AUTO: 91 FL (ref 80–99.9)
PDW BLD-RTO: 14 FL (ref 11.5–15)
PLATELET # BLD: 198 E9/L (ref 130–450)
PMV BLD AUTO: 10.2 FL (ref 7–12)
POTASSIUM SERPL-SCNC: 4.3 MMOL/L (ref 3.5–5)
RBC # BLD: 4.12 E12/L (ref 3.5–5.5)
SODIUM BLD-SCNC: 140 MMOL/L (ref 132–146)
TOTAL PROTEIN: 6.9 G/DL (ref 6.4–8.3)
URINE CULTURE, ROUTINE: NORMAL
WBC # BLD: 5.8 E9/L (ref 4.5–11.5)

## 2019-06-30 PROCEDURE — 6360000002 HC RX W HCPCS: Performed by: SPECIALIST

## 2019-06-30 PROCEDURE — 6360000002 HC RX W HCPCS: Performed by: INTERNAL MEDICINE

## 2019-06-30 PROCEDURE — 80053 COMPREHEN METABOLIC PANEL: CPT

## 2019-06-30 PROCEDURE — 6370000000 HC RX 637 (ALT 250 FOR IP): Performed by: INTERNAL MEDICINE

## 2019-06-30 PROCEDURE — 2580000003 HC RX 258: Performed by: SPECIALIST

## 2019-06-30 PROCEDURE — 85027 COMPLETE CBC AUTOMATED: CPT

## 2019-06-30 PROCEDURE — 36415 COLL VENOUS BLD VENIPUNCTURE: CPT

## 2019-06-30 RX ADMIN — ASPIRIN 81 MG 81 MG: 81 TABLET ORAL at 09:32

## 2019-06-30 RX ADMIN — ENOXAPARIN SODIUM 40 MG: 40 INJECTION SUBCUTANEOUS at 09:32

## 2019-06-30 RX ADMIN — FLUOXETINE 20 MG: 20 CAPSULE ORAL at 09:32

## 2019-06-30 RX ADMIN — WATER 2 G: 1 INJECTION INTRAMUSCULAR; INTRAVENOUS; SUBCUTANEOUS at 11:28

## 2019-06-30 RX ADMIN — METFORMIN HYDROCHLORIDE 500 MG: 500 TABLET ORAL at 09:32

## 2019-06-30 NOTE — PROGRESS NOTES
Infectious Disease Note    Chief Complaint   Patient presents with    Fever     bodyaches, fever and dizziness started today     Reason for Admission: [facility] [R65.10]  Bacteremia [R78.81]  Primary Care Physician: Jakob Saucedo MD  Reason for Consultation:SIRS (systemic inflammatory response syndrome) ([facility] [R65.10]  Bacteremia [R78.81]  Requesting physician: Baron Bettencourt DO    History of Present Illness  Marquez Rea is a 59y.o. year old female who has has a past medical history of Depression, Diabetes mellitus ([facility] [address] Hypertension, Hypothyroidism, and LGSIL on Pap smear of cervix. [place] Pt feels better  No new issues or concerns  Tolerating treatment  No family present  Unable to get PICC    Review of Systems  Please see HPI above. Gen: NO fever, chills, fatigue, weakness,loss of appetite  Head: NO headache, vision change, hearing loss  Chest: NO chest pain, chest heaviness, palpitations  Lungs: NO shortness of breath, wheezing, coughing  Abdomen: NO Mabdominal pain, nausea, vomiting, diarrhea,   Extremities lower extremity edema, no myalgias, arthralgias  Urinary: NO dysuria, hematuria, or increase in frequency  Neurologic: NO lightheadedness, dizziness, confusion, syncope  Psychiatric: NO depression, suicidal ideation, or anxiety    Past Medical History:   Diagnosis Date    Depression     [facility]     Hypertension     Hypothyroidism     LGSIL on Pap smear of cervix      Past Surgical History:   Procedure Laterality Date    COLPOSCOPY  04/10/2018    dr. Álvarez Ask HYSTERECTOMY       Social History  TOBACCO:   reports that she has never smoked. She has never used smokeless tobacco.  ETOH:   reports that she does not drink alcohol.   Current Facility-Administered Medications   Medication Dose Route Frequency Provider Last Rate Last Dose    lidocaine 1 % injection 5 mL  5 mL Intradermal Once Denilson Allen MD        sodium chloride flush 0.9 % 6/28/2019  Location:200 Exam: US DUP LOWER EXTREMITY RIGHT SHILO Comparison: None Indications: Pain and swelling Technique: Using real-time, color, and spectral Doppler waveform analysis, ultrasound imaging of the right lower extremity deep venous system was performed, from the common femoral vein to the proximal calf. Findings: The visualized veins of the deep venous system are grossly patent without an echogenic focus within them. These veins compress fully and demonstrate flow bilaterally. No significant reflux is seen within the greater saphenous veins. No evidence of right lower extremity deep venous thrombus from common femoral vein to proximal calf. Assessment/Plan  Patient is a 59 y.o. female who has been admitted to [address] [place] [address] Complaint   Patient presents with    Fever     bodyaches, fever and dizziness started today     SIRS (systemic inflammatory response syndrome) ([facility]) [R65.10]  Bacteremia [R78.81] grp b strep from rle cellulitis  esr38  Echo pending  Order picc  Explained risk and benefits of picc  If echo ok can d/c   fidelia resolved  Leukocytosis resolved  Stopped linezolid ceftriaxone for 2 weeks  DM      -can d/c today   F/u in am for picc    The active problem list is as follows:  Patient Active Problem List    Diagnosis Date Noted    Bacteremia 06/28/2019    SIRS (systemic inflammatory response syndrome) ([facility] [address] 06/27/2019    DJD (degenerative joint disease) of knee 05/13/2014    Medial meniscus tear 05/13/2014    Left knee pain 05/13/2014       The patient was educated about the diagnosis, prognosis, indications, risks and benefits of treatment. The patient is in agreement with the proposed medical treatment plan. An opportunity to ask questions was given to the patient and questions were answered.       Electronically signed by Noemi Rutledge MD on 6/30/2019 at 1:02 PM    Phone [phone]  Fax [phone]

## 2019-07-01 ENCOUNTER — HOSPITAL ENCOUNTER (OUTPATIENT)
Age: 59
Discharge: HOME | End: 2019-07-03
Payer: COMMERCIAL

## 2019-07-01 ENCOUNTER — HOSPITAL ENCOUNTER (OUTPATIENT)
Age: 59
Discharge: HOME | End: 2019-07-01

## 2019-07-01 ENCOUNTER — HOSPITAL ENCOUNTER (OUTPATIENT)
Dept: INTERVENTIONAL RADIOLOGY/VASCULAR | Age: 59
Discharge: HOME | End: 2019-07-01

## 2019-07-01 DIAGNOSIS — R65.10 SIRS (SYSTEMIC INFLAMMATORY RESPONSE SYNDROME) (HCC): ICD-10-CM

## 2019-07-01 DIAGNOSIS — R78.81 BACTEREMIA: ICD-10-CM

## 2019-07-01 LAB
ALBUMIN SERPL-MCNC: 3.4 G/DL (ref 3.5–5.2)
ALP BLD-CCNC: 89 U/L (ref 35–104)
ALT SERPL-CCNC: 22 U/L (ref 0–32)
ANION GAP SERPL CALCULATED.3IONS-SCNC: 14 MMOL/L (ref 7–16)
AST SERPL-CCNC: 22 U/L (ref 0–31)
BILIRUB SERPL-MCNC: 0.2 MG/DL (ref 0–1.2)
BUN BLDV-MCNC: 17 MG/DL (ref 8–23)
C-REACTIVE PROTEIN: 3.8 MG/DL (ref 0–0.4)
CALCIUM SERPL-MCNC: 9 MG/DL (ref 8.6–10.2)
CHLORIDE BLD-SCNC: 102 MMOL/L (ref 98–107)
CO2: 24 MMOL/L (ref 22–29)
CREAT SERPL-MCNC: 0.8 MG/DL (ref 0.5–1)
EKG ATRIAL RATE: 96 BPM
EKG P AXIS: 59 DEGREES
EKG P-R INTERVAL: 148 MS
EKG Q-T INTERVAL: 352 MS
EKG QRS DURATION: 78 MS
EKG QTC CALCULATION (BAZETT): 444 MS
EKG T AXIS: 49 DEGREES
EKG VENTRICULAR RATE: 96 BPM
GFR AFRICAN AMERICAN: >60
GFR NON-AFRICAN AMERICAN: >60 ML/MIN/1.73
GLUCOSE BLD-MCNC: 166 MG/DL (ref 74–99)
HCT VFR BLD CALC: 35.8 % (ref 34–48)
HEMOGLOBIN: 11.2 G/DL (ref 11.5–15.5)
MCH RBC QN AUTO: 29.2 PG (ref 26–35)
MCHC RBC AUTO-ENTMCNC: 31.3 % (ref 32–34.5)
MCV RBC AUTO: 93.2 FL (ref 80–99.9)
PDW BLD-RTO: 14.2 FL (ref 11.5–15)
PLATELET # BLD: 226 E9/L (ref 130–450)
PMV BLD AUTO: 10.5 FL (ref 7–12)
POTASSIUM SERPL-SCNC: 4.1 MMOL/L (ref 3.5–5)
RBC # BLD: 3.84 E12/L (ref 3.5–5.5)
SODIUM BLD-SCNC: 140 MMOL/L (ref 132–146)
TOTAL PROTEIN: 6.7 G/DL (ref 6.4–8.3)
WBC # BLD: 5.8 E9/L (ref 4.5–11.5)

## 2019-07-01 PROCEDURE — 85027 COMPLETE CBC AUTOMATED: CPT

## 2019-07-01 PROCEDURE — 2500000003 HC RX 250 WO HCPCS: Performed by: RADIOLOGY

## 2019-07-01 PROCEDURE — C1751 CATH, INF, PER/CENT/MIDLINE: HCPCS

## 2019-07-01 PROCEDURE — 76937 US GUIDE VASCULAR ACCESS: CPT | Performed by: RADIOLOGY

## 2019-07-01 PROCEDURE — 80053 COMPREHEN METABOLIC PANEL: CPT

## 2019-07-01 PROCEDURE — 86140 C-REACTIVE PROTEIN: CPT

## 2019-07-01 PROCEDURE — 36410 VNPNXR 3YR/> PHY/QHP DX/THER: CPT | Performed by: RADIOLOGY

## 2019-07-01 PROCEDURE — 76000 FLUOROSCOPY <1 HR PHYS/QHP: CPT | Performed by: RADIOLOGY

## 2019-07-01 PROCEDURE — 85651 RBC SED RATE NONAUTOMATED: CPT

## 2019-07-01 RX ORDER — HEPARIN SODIUM (PORCINE) LOCK FLUSH IV SOLN 100 UNIT/ML 100 UNIT/ML
100 SOLUTION INTRAVENOUS PRN
Status: DISCONTINUED | OUTPATIENT
Start: 2019-07-01 | End: 2019-07-02 | Stop reason: HOSPADM

## 2019-07-01 RX ORDER — LIDOCAINE HYDROCHLORIDE 10 MG/ML
5 INJECTION, SOLUTION INFILTRATION; PERINEURAL ONCE
Status: COMPLETED | OUTPATIENT
Start: 2019-07-01 | End: 2019-07-01

## 2019-07-01 RX ADMIN — LIDOCAINE HYDROCHLORIDE 5 ML: 10 INJECTION, SOLUTION INFILTRATION; PERINEURAL at 13:08

## 2019-07-01 ASSESSMENT — PAIN SCALES - GENERAL: PAINLEVEL_OUTOF10: 5

## 2019-07-02 LAB
CULTURE, BLOOD 2: NORMAL
SEDIMENTATION RATE, ERYTHROCYTE: 39 MM/HR (ref 0–20)

## 2019-07-03 LAB
BLOOD CULTURE, ROUTINE: NORMAL
CULTURE, BLOOD 2: NORMAL
ORGANISM: ABNORMAL

## 2019-07-05 ENCOUNTER — HOSPITAL ENCOUNTER (OUTPATIENT)
Age: 59
Discharge: HOME | End: 2019-07-07

## 2019-07-05 LAB
ALBUMIN SERPL-MCNC: 3.8 G/DL (ref 3.5–5.2)
ALP BLD-CCNC: 103 U/L (ref 35–104)
ALT SERPL-CCNC: 28 U/L (ref 0–32)
ANION GAP SERPL CALCULATED.3IONS-SCNC: 12 MMOL/L (ref 7–16)
AST SERPL-CCNC: 24 U/L (ref 0–31)
BILIRUB SERPL-MCNC: 0.3 MG/DL (ref 0–1.2)
BUN BLDV-MCNC: 21 MG/DL (ref 8–23)
CALCIUM SERPL-MCNC: 9.2 MG/DL (ref 8.6–10.2)
CHLORIDE BLD-SCNC: 101 MMOL/L (ref 98–107)
CO2: 27 MMOL/L (ref 22–29)
CREAT SERPL-MCNC: 1 MG/DL (ref 0.5–1)
GFR AFRICAN AMERICAN: >60
GFR NON-AFRICAN AMERICAN: 56 ML/MIN/1.73
GLUCOSE BLD-MCNC: 145 MG/DL (ref 74–99)
HCT VFR BLD CALC: 36.8 % (ref 34–48)
HEMOGLOBIN: 11.7 G/DL (ref 11.5–15.5)
MCH RBC QN AUTO: 29.1 PG (ref 26–35)
MCHC RBC AUTO-ENTMCNC: 31.8 % (ref 32–34.5)
MCV RBC AUTO: 91.5 FL (ref 80–99.9)
PDW BLD-RTO: 14 FL (ref 11.5–15)
PLATELET # BLD: 242 E9/L (ref 130–450)
PMV BLD AUTO: 10.4 FL (ref 7–12)
POTASSIUM SERPL-SCNC: 4.3 MMOL/L (ref 3.5–5)
RBC # BLD: 4.02 E12/L (ref 3.5–5.5)
SODIUM BLD-SCNC: 140 MMOL/L (ref 132–146)
TOTAL PROTEIN: 7.2 G/DL (ref 6.4–8.3)
WBC # BLD: 9.9 E9/L (ref 4.5–11.5)

## 2019-07-05 PROCEDURE — 80053 COMPREHEN METABOLIC PANEL: CPT

## 2019-07-05 PROCEDURE — 85027 COMPLETE CBC AUTOMATED: CPT

## 2019-07-08 ENCOUNTER — HOSPITAL ENCOUNTER (OUTPATIENT)
Age: 59
Discharge: HOME | End: 2019-07-10

## 2019-07-08 LAB
ALBUMIN SERPL-MCNC: 4 G/DL (ref 3.5–5.2)
ALP BLD-CCNC: 102 U/L (ref 35–104)
ALT SERPL-CCNC: 23 U/L (ref 0–32)
ANION GAP SERPL CALCULATED.3IONS-SCNC: 15 MMOL/L (ref 7–16)
AST SERPL-CCNC: 20 U/L (ref 0–31)
BILIRUB SERPL-MCNC: 0.3 MG/DL (ref 0–1.2)
BUN BLDV-MCNC: 25 MG/DL (ref 8–23)
C-REACTIVE PROTEIN: 0.9 MG/DL (ref 0–0.4)
CALCIUM SERPL-MCNC: 9.3 MG/DL (ref 8.6–10.2)
CHLORIDE BLD-SCNC: 97 MMOL/L (ref 98–107)
CO2: 25 MMOL/L (ref 22–29)
CREAT SERPL-MCNC: 1 MG/DL (ref 0.5–1)
GFR AFRICAN AMERICAN: >60
GFR NON-AFRICAN AMERICAN: 56 ML/MIN/1.73
GLUCOSE BLD-MCNC: 132 MG/DL (ref 74–99)
HCT VFR BLD CALC: 38.8 % (ref 34–48)
HEMOGLOBIN: 12.4 G/DL (ref 11.5–15.5)
MCH RBC QN AUTO: 29.3 PG (ref 26–35)
MCHC RBC AUTO-ENTMCNC: 32 % (ref 32–34.5)
MCV RBC AUTO: 91.7 FL (ref 80–99.9)
PDW BLD-RTO: 14.1 FL (ref 11.5–15)
PLATELET # BLD: 243 E9/L (ref 130–450)
PMV BLD AUTO: 10.6 FL (ref 7–12)
POTASSIUM SERPL-SCNC: 4.6 MMOL/L (ref 3.5–5)
RBC # BLD: 4.23 E12/L (ref 3.5–5.5)
SODIUM BLD-SCNC: 137 MMOL/L (ref 132–146)
TOTAL PROTEIN: 7.1 G/DL (ref 6.4–8.3)
WBC # BLD: 9.6 E9/L (ref 4.5–11.5)

## 2019-07-08 PROCEDURE — 85027 COMPLETE CBC AUTOMATED: CPT

## 2019-07-08 PROCEDURE — 85651 RBC SED RATE NONAUTOMATED: CPT

## 2019-07-08 PROCEDURE — 86140 C-REACTIVE PROTEIN: CPT

## 2019-07-08 PROCEDURE — 80053 COMPREHEN METABOLIC PANEL: CPT

## 2019-07-09 LAB — SEDIMENTATION RATE, ERYTHROCYTE: 21 MM/HR (ref 0–20)

## 2019-07-10 LAB
B.E.: ABNORMAL MMOL/L (ref -3–3)
DELIVERY SYSTEMS: ABNORMAL
DEVICE: ABNORMAL
FIO2 ARTERIAL: ABNORMAL
HCO3 ARTERIAL: ABNORMAL MMOL/L (ref 22–26)
O2 SATURATION: ABNORMAL % (ref 92–98.5)
OPERATOR ID: ABNORMAL
PCO2 ARTERIAL: ABNORMAL MMHG (ref 35–45)
PH BLOOD GAS: ABNORMAL (ref 7.35–7.45)
PO2 ARTERIAL: ABNORMAL MMHG (ref 60–80)
SOURCE, BLOOD GAS: ABNORMAL

## 2019-07-11 ENCOUNTER — HOSPITAL ENCOUNTER (OUTPATIENT)
Age: 59
Discharge: HOME | End: 2019-07-13
Payer: COMMERCIAL

## 2019-07-15 ENCOUNTER — HOSPITAL ENCOUNTER (OUTPATIENT)
Age: 59
Discharge: HOME | End: 2019-07-17

## 2019-07-15 LAB
ALBUMIN SERPL-MCNC: 3.8 G/DL (ref 3.5–5.2)
ALP BLD-CCNC: 97 U/L (ref 35–104)
ALT SERPL-CCNC: 21 U/L (ref 0–32)
ANION GAP SERPL CALCULATED.3IONS-SCNC: 16 MMOL/L (ref 7–16)
AST SERPL-CCNC: 20 U/L (ref 0–31)
BILIRUB SERPL-MCNC: 0.2 MG/DL (ref 0–1.2)
BUN BLDV-MCNC: 26 MG/DL (ref 8–23)
C-REACTIVE PROTEIN: 0.7 MG/DL (ref 0–0.4)
CALCIUM SERPL-MCNC: 9.4 MG/DL (ref 8.6–10.2)
CHLORIDE BLD-SCNC: 101 MMOL/L (ref 98–107)
CO2: 21 MMOL/L (ref 22–29)
CREAT SERPL-MCNC: 0.9 MG/DL (ref 0.5–1)
GFR AFRICAN AMERICAN: >60
GFR NON-AFRICAN AMERICAN: >60 ML/MIN/1.73
GLUCOSE BLD-MCNC: 126 MG/DL (ref 74–99)
HCT VFR BLD CALC: 35.5 % (ref 34–48)
HEMOGLOBIN: 11.3 G/DL (ref 11.5–15.5)
MCH RBC QN AUTO: 29.1 PG (ref 26–35)
MCHC RBC AUTO-ENTMCNC: 31.8 % (ref 32–34.5)
MCV RBC AUTO: 91.5 FL (ref 80–99.9)
PDW BLD-RTO: 13.9 FL (ref 11.5–15)
PLATELET # BLD: 199 E9/L (ref 130–450)
PMV BLD AUTO: 10.6 FL (ref 7–12)
POTASSIUM SERPL-SCNC: 4.9 MMOL/L (ref 3.5–5)
RBC # BLD: 3.88 E12/L (ref 3.5–5.5)
SEDIMENTATION RATE, ERYTHROCYTE: 28 MM/HR (ref 0–20)
SODIUM BLD-SCNC: 138 MMOL/L (ref 132–146)
TOTAL PROTEIN: 6.7 G/DL (ref 6.4–8.3)
WBC # BLD: 5.7 E9/L (ref 4.5–11.5)

## 2019-07-15 PROCEDURE — 85651 RBC SED RATE NONAUTOMATED: CPT

## 2019-07-15 PROCEDURE — 80053 COMPREHEN METABOLIC PANEL: CPT

## 2019-07-15 PROCEDURE — 85027 COMPLETE CBC AUTOMATED: CPT

## 2019-07-15 PROCEDURE — 86140 C-REACTIVE PROTEIN: CPT

## 2025-04-03 ENCOUNTER — HOSPITAL ENCOUNTER (EMERGENCY)
Age: 65
Discharge: LEFT BEFORE DISCHARGE | End: 2025-04-03
Payer: COMMERCIAL

## 2025-04-03 VITALS
SYSTOLIC BLOOD PRESSURE: 136 MMHG | RESPIRATION RATE: 16 BRPM | TEMPERATURE: 98 F | OXYGEN SATURATION: 98 % | BODY MASS INDEX: 35.33 KG/M2 | WEIGHT: 187 LBS | DIASTOLIC BLOOD PRESSURE: 71 MMHG | HEART RATE: 68 BPM

## 2025-04-03 DIAGNOSIS — Z53.21 ELOPED FROM EMERGENCY DEPARTMENT: Primary | ICD-10-CM

## 2025-04-03 PROCEDURE — 99281 EMR DPT VST MAYX REQ PHY/QHP: CPT

## 2025-04-03 ASSESSMENT — LIFESTYLE VARIABLES
HOW MANY STANDARD DRINKS CONTAINING ALCOHOL DO YOU HAVE ON A TYPICAL DAY: 1 OR 2
HOW OFTEN DO YOU HAVE A DRINK CONTAINING ALCOHOL: MONTHLY OR LESS

## 2025-04-03 NOTE — ED TRIAGE NOTES
FIRST PROVIDER CONTACT ASSESSMENT NOTE       Department of Emergency Medicine                 First Provider Note            4/3/25  5:43 PM EDT    Date of Encounter: No admission date for patient encounter.    Patient Name: Yakov Drew  : 1960  MRN: [id number]    Chief Complaint: No chief complaint on file.      History of Present Illness:   Yakov Drew is a 64 y.o. female who presents to the ED for lightheadedness.   States she felt like she was going to pass out.   N/V multiple times.   Some better overall     Focused Physical Exam:  VS:    ED Triage Vitals [25 1711]   BP Systolic BP Percentile Diastolic BP Percentile Temp Temp src Pulse Resp SpO2   -- -- -- 98 °F (36.7 °C) -- 72 -- 98 %      Height Weight         -- --              Physical Ex: Constitutional: Alert and non-toxic.    Medical History:  has a past medical history of Depression, Diabetes mellitus (HCC), Hypertension, Hypothyroidism, and LGSIL on Pap smear of cervix.  Surgical History:  has a past surgical history that includes Hysterectomy and Colposcopy (04/10/2018).  Social History:  reports that she has never smoked. She has never used smokeless tobacco. She reports that she does not drink alcohol and does not use drugs.  Family History: family history includes Breast Cancer in her paternal cousin.    Allergies: Patient has no known allergies.     Initial Plan of Care: Initiate Treatment-Testing, Proceed toTreatment Area When Bed Available for ED Attending/MLP to Continue Care      ---END OF FIRST PROVIDER CONTACT ASSESSMENT NOTE---  Electronically signed by Nora Lizarraga PA-C   DD: 4/3/25